# Patient Record
(demographics unavailable — no encounter records)

---

## 2025-06-27 NOTE — HEALTH RISK ASSESSMENT
[Good] : ~his/her~  mood as  good [Yes] : Yes [0] : 2) Feeling down, depressed, or hopeless: Not at all (0) [PHQ-2 Negative - No further assessment needed] : PHQ-2 Negative - No further assessment needed [Never] : Never [Employed] : employed [] :  [Reports changes in vision] : Reports changes in vision [FreeTextEntry1] : Urinary complaints [No] : In the past 12 months have you used drugs other than those required for medical reasons? No [[id number]] : 0 [Feels Safe at Home] : Feels safe at home [Reports changes in hearing] : Reports no changes in hearing [Reports changes in dental health] : Reports no changes in dental health [ColonoscopyComments] : None prior

## 2025-06-27 NOTE — HISTORY OF PRESENT ILLNESS
[FreeTextEntry1] : CPE [de-identified] : Mr. FLORA ESTRELLA is a 45 year old male presenting for CPE    - reviewed age appropriate preventive screening exams - reviewed and updated PMH/Medications